# Patient Record
Sex: FEMALE | Race: WHITE | URBAN - METROPOLITAN AREA
[De-identification: names, ages, dates, MRNs, and addresses within clinical notes are randomized per-mention and may not be internally consistent; named-entity substitution may affect disease eponyms.]

---

## 2018-01-01 ENCOUNTER — APPOINTMENT (OUTPATIENT)
Dept: CT IMAGING | Age: 83
DRG: 064 | End: 2018-01-01
Payer: MEDICARE

## 2018-01-01 ENCOUNTER — APPOINTMENT (OUTPATIENT)
Dept: GENERAL RADIOLOGY | Age: 83
DRG: 064 | End: 2018-01-01
Payer: MEDICARE

## 2018-01-01 ENCOUNTER — APPOINTMENT (OUTPATIENT)
Dept: MRI IMAGING | Age: 83
DRG: 064 | End: 2018-01-01
Payer: MEDICARE

## 2018-01-01 ENCOUNTER — CARE COORDINATION (OUTPATIENT)
Dept: CASE MANAGEMENT | Age: 83
End: 2018-01-01

## 2018-01-01 ENCOUNTER — HOSPITAL ENCOUNTER (INPATIENT)
Age: 83
LOS: 6 days | Discharge: HOSPICE | DRG: 064 | End: 2018-10-07
Attending: EMERGENCY MEDICINE | Admitting: FAMILY MEDICINE
Payer: MEDICARE

## 2018-01-01 VITALS
DIASTOLIC BLOOD PRESSURE: 64 MMHG | SYSTOLIC BLOOD PRESSURE: 149 MMHG | BODY MASS INDEX: 38.18 KG/M2 | HEIGHT: 60 IN | RESPIRATION RATE: 20 BRPM | HEART RATE: 64 BPM | OXYGEN SATURATION: 90 % | TEMPERATURE: 97.8 F | WEIGHT: 194.45 LBS

## 2018-01-01 DIAGNOSIS — M25.561 PAIN, JOINT, LOWER LEG, RIGHT: ICD-10-CM

## 2018-01-01 DIAGNOSIS — R41.82 ALTERED MENTAL STATUS, UNSPECIFIED ALTERED MENTAL STATUS TYPE: Primary | ICD-10-CM

## 2018-01-01 DIAGNOSIS — I63.9 CEREBROVASCULAR ACCIDENT (CVA), UNSPECIFIED MECHANISM (HCC): ICD-10-CM

## 2018-01-01 LAB
-: ABNORMAL
ABSOLUTE BANDS #: 0.15 K/UL (ref 0–1)
ABSOLUTE BANDS #: 0.43 K/UL (ref 0–1)
ABSOLUTE BANDS #: 0.61 K/UL (ref 0–1)
ABSOLUTE BANDS #: 0.68 K/UL (ref 0–1)
ABSOLUTE BANDS #: 1.07 K/UL (ref 0–1)
ABSOLUTE BANDS #: 1.67 K/UL (ref 0–1)
ABSOLUTE EOS #: 0 K/UL (ref 0–0.4)
ABSOLUTE EOS #: 0.15 K/UL (ref 0–0.4)
ABSOLUTE EOS #: 0.17 K/UL (ref 0–0.4)
ABSOLUTE IMMATURE GRANULOCYTE: ABNORMAL K/UL (ref 0–0.3)
ABSOLUTE LYMPH #: 1.04 K/UL (ref 1–4.8)
ABSOLUTE LYMPH #: 1.31 K/UL (ref 1–4.8)
ABSOLUTE LYMPH #: 1.36 K/UL (ref 1–4.8)
ABSOLUTE LYMPH #: 1.67 K/UL (ref 1–4.8)
ABSOLUTE LYMPH #: 1.72 K/UL (ref 1–4.8)
ABSOLUTE LYMPH #: 1.98 K/UL (ref 1–4.8)
ABSOLUTE LYMPH #: 2.14 K/UL (ref 1–4.8)
ABSOLUTE MONO #: 0.46 K/UL (ref 0.1–1.3)
ABSOLUTE MONO #: 1.04 K/UL (ref 0.1–1.3)
ABSOLUTE MONO #: 1.31 K/UL (ref 0.1–1.3)
ABSOLUTE MONO #: 1.43 K/UL (ref 0.1–1.3)
ABSOLUTE MONO #: 1.52 K/UL (ref 0.1–1.3)
ABSOLUTE MONO #: 1.6 K/UL (ref 0.1–1.3)
ABSOLUTE MONO #: 1.87 K/UL (ref 0.1–1.3)
ALBUMIN SERPL-MCNC: 2.9 G/DL (ref 3.5–5.2)
ALBUMIN/GLOBULIN RATIO: ABNORMAL (ref 1–2.5)
ALP BLD-CCNC: 71 U/L (ref 35–104)
ALT SERPL-CCNC: 13 U/L (ref 5–33)
AMMONIA: 34 UMOL/L (ref 11–51)
AMORPHOUS: ABNORMAL
AMYLASE: 46 U/L (ref 28–100)
ANION GAP SERPL CALCULATED.3IONS-SCNC: 11 MMOL/L (ref 9–17)
ANION GAP SERPL CALCULATED.3IONS-SCNC: 11 MMOL/L (ref 9–17)
ANION GAP SERPL CALCULATED.3IONS-SCNC: 12 MMOL/L (ref 9–17)
ANION GAP SERPL CALCULATED.3IONS-SCNC: 13 MMOL/L (ref 9–17)
ANION GAP SERPL CALCULATED.3IONS-SCNC: 13 MMOL/L (ref 9–17)
ANION GAP SERPL CALCULATED.3IONS-SCNC: 8 MMOL/L (ref 9–17)
ANION GAP SERPL CALCULATED.3IONS-SCNC: 9 MMOL/L (ref 9–17)
AST SERPL-CCNC: 24 U/L
BACTERIA: ABNORMAL
BANDS: 1 % (ref 0–10)
BANDS: 11 % (ref 0–10)
BANDS: 3 % (ref 0–10)
BANDS: 4 % (ref 0–10)
BANDS: 4 % (ref 0–10)
BANDS: 6 % (ref 0–10)
BASOPHILS # BLD: 0 % (ref 0–2)
BASOPHILS # BLD: 1 % (ref 0–2)
BASOPHILS ABSOLUTE: 0 K/UL (ref 0–0.2)
BASOPHILS ABSOLUTE: 0.15 K/UL (ref 0–0.2)
BILIRUB SERPL-MCNC: 0.54 MG/DL (ref 0.3–1.2)
BILIRUBIN DIRECT: 0.2 MG/DL
BILIRUBIN URINE: NEGATIVE
BILIRUBIN URINE: NEGATIVE
BILIRUBIN, INDIRECT: 0.34 MG/DL (ref 0–1)
BUN BLDV-MCNC: 20 MG/DL (ref 8–23)
BUN BLDV-MCNC: 22 MG/DL (ref 8–23)
BUN BLDV-MCNC: 23 MG/DL (ref 8–23)
BUN BLDV-MCNC: 27 MG/DL (ref 8–23)
BUN BLDV-MCNC: 27 MG/DL (ref 8–23)
BUN BLDV-MCNC: 28 MG/DL (ref 8–23)
BUN BLDV-MCNC: 28 MG/DL (ref 8–23)
BUN/CREAT BLD: ABNORMAL (ref 9–20)
CALCIUM SERPL-MCNC: 8.2 MG/DL (ref 8.6–10.4)
CALCIUM SERPL-MCNC: 8.2 MG/DL (ref 8.6–10.4)
CALCIUM SERPL-MCNC: 8.4 MG/DL (ref 8.6–10.4)
CALCIUM SERPL-MCNC: 8.4 MG/DL (ref 8.6–10.4)
CALCIUM SERPL-MCNC: 8.5 MG/DL (ref 8.6–10.4)
CALCIUM SERPL-MCNC: 8.8 MG/DL (ref 8.6–10.4)
CALCIUM SERPL-MCNC: 9.3 MG/DL (ref 8.6–10.4)
CASTS UA: ABNORMAL /LPF
CHLORIDE BLD-SCNC: 102 MMOL/L (ref 98–107)
CHLORIDE BLD-SCNC: 102 MMOL/L (ref 98–107)
CHLORIDE BLD-SCNC: 105 MMOL/L (ref 98–107)
CHLORIDE BLD-SCNC: 105 MMOL/L (ref 98–107)
CHLORIDE BLD-SCNC: 106 MMOL/L (ref 98–107)
CHLORIDE BLD-SCNC: 108 MMOL/L (ref 98–107)
CHLORIDE BLD-SCNC: 109 MMOL/L (ref 98–107)
CO2: 22 MMOL/L (ref 20–31)
CO2: 23 MMOL/L (ref 20–31)
CO2: 24 MMOL/L (ref 20–31)
CO2: 24 MMOL/L (ref 20–31)
CO2: 26 MMOL/L (ref 20–31)
COLOR: ABNORMAL
COLOR: YELLOW
COMMENT UA: ABNORMAL
COMMENT UA: ABNORMAL
CREAT SERPL-MCNC: 0.76 MG/DL (ref 0.5–0.9)
CREAT SERPL-MCNC: 0.83 MG/DL (ref 0.5–0.9)
CREAT SERPL-MCNC: 0.86 MG/DL (ref 0.5–0.9)
CREAT SERPL-MCNC: 0.87 MG/DL (ref 0.5–0.9)
CREAT SERPL-MCNC: 0.93 MG/DL (ref 0.5–0.9)
CRYSTALS, UA: ABNORMAL /HPF
CULTURE: NO GROWTH
DIFFERENTIAL TYPE: ABNORMAL
EOSINOPHILS RELATIVE PERCENT: 0 % (ref 0–4)
EOSINOPHILS RELATIVE PERCENT: 1 % (ref 0–4)
EOSINOPHILS RELATIVE PERCENT: 1 % (ref 0–4)
EPITHELIAL CELLS UA: ABNORMAL /HPF
FOLATE: 18.9 NG/ML
GFR AFRICAN AMERICAN: >60 ML/MIN
GFR NON-AFRICAN AMERICAN: 56 ML/MIN
GFR NON-AFRICAN AMERICAN: >60 ML/MIN
GFR SERPL CREATININE-BSD FRML MDRD: ABNORMAL ML/MIN/{1.73_M2}
GLOBULIN: ABNORMAL G/DL (ref 1.5–3.8)
GLUCOSE BLD-MCNC: 158 MG/DL (ref 70–99)
GLUCOSE BLD-MCNC: 162 MG/DL (ref 70–99)
GLUCOSE BLD-MCNC: 163 MG/DL (ref 70–99)
GLUCOSE BLD-MCNC: 177 MG/DL (ref 70–99)
GLUCOSE BLD-MCNC: 178 MG/DL (ref 70–99)
GLUCOSE BLD-MCNC: 206 MG/DL (ref 70–99)
GLUCOSE BLD-MCNC: 221 MG/DL (ref 70–99)
GLUCOSE URINE: ABNORMAL
GLUCOSE URINE: NEGATIVE
HCT VFR BLD CALC: 37.4 % (ref 36–46)
HCT VFR BLD CALC: 39 % (ref 36–46)
HCT VFR BLD CALC: 39.6 % (ref 36–46)
HCT VFR BLD CALC: 40.3 % (ref 36–46)
HCT VFR BLD CALC: 40.8 % (ref 36–46)
HCT VFR BLD CALC: 42.1 % (ref 36–46)
HCT VFR BLD CALC: 43.7 % (ref 36–46)
HEMOGLOBIN: 12.1 G/DL (ref 12–16)
HEMOGLOBIN: 12.8 G/DL (ref 12–16)
HEMOGLOBIN: 13 G/DL (ref 12–16)
HEMOGLOBIN: 13.1 G/DL (ref 12–16)
HEMOGLOBIN: 13.2 G/DL (ref 12–16)
HEMOGLOBIN: 13.7 G/DL (ref 12–16)
HEMOGLOBIN: 14.6 G/DL (ref 12–16)
IMMATURE GRANULOCYTES: ABNORMAL %
KETONES, URINE: ABNORMAL
KETONES, URINE: NEGATIVE
LACTIC ACID, SEPSIS WHOLE BLOOD: NORMAL MMOL/L (ref 0.5–1.9)
LACTIC ACID, SEPSIS: 1.4 MMOL/L (ref 0.5–1.9)
LEUKOCYTE ESTERASE, URINE: NEGATIVE
LEUKOCYTE ESTERASE, URINE: NEGATIVE
LIPASE: 10 U/L (ref 13–60)
LYMPHOCYTES # BLD: 11 % (ref 24–44)
LYMPHOCYTES # BLD: 12 % (ref 24–44)
LYMPHOCYTES # BLD: 12 % (ref 24–44)
LYMPHOCYTES # BLD: 13 % (ref 24–44)
LYMPHOCYTES # BLD: 7 % (ref 24–44)
LYMPHOCYTES # BLD: 8 % (ref 24–44)
LYMPHOCYTES # BLD: 9 % (ref 24–44)
Lab: NORMAL
MCH RBC QN AUTO: 30.9 PG (ref 26–34)
MCH RBC QN AUTO: 31 PG (ref 26–34)
MCH RBC QN AUTO: 31.2 PG (ref 26–34)
MCH RBC QN AUTO: 31.2 PG (ref 26–34)
MCH RBC QN AUTO: 31.3 PG (ref 26–34)
MCH RBC QN AUTO: 31.3 PG (ref 26–34)
MCH RBC QN AUTO: 31.5 PG (ref 26–34)
MCHC RBC AUTO-ENTMCNC: 32.1 G/DL (ref 31–37)
MCHC RBC AUTO-ENTMCNC: 32.4 G/DL (ref 31–37)
MCHC RBC AUTO-ENTMCNC: 32.5 G/DL (ref 31–37)
MCHC RBC AUTO-ENTMCNC: 32.5 G/DL (ref 31–37)
MCHC RBC AUTO-ENTMCNC: 32.9 G/DL (ref 31–37)
MCHC RBC AUTO-ENTMCNC: 33.2 G/DL (ref 31–37)
MCHC RBC AUTO-ENTMCNC: 33.3 G/DL (ref 31–37)
MCV RBC AUTO: 92.9 FL (ref 80–100)
MCV RBC AUTO: 93.8 FL (ref 80–100)
MCV RBC AUTO: 94.8 FL (ref 80–100)
MCV RBC AUTO: 95.4 FL (ref 80–100)
MCV RBC AUTO: 96.1 FL (ref 80–100)
MCV RBC AUTO: 97.1 FL (ref 80–100)
MCV RBC AUTO: 97.3 FL (ref 80–100)
METAMYELOCYTES ABSOLUTE COUNT: 0.14 K/UL
METAMYELOCYTES ABSOLUTE COUNT: 0.15 K/UL
METAMYELOCYTES ABSOLUTE COUNT: 0.18 K/UL
METAMYELOCYTES ABSOLUTE COUNT: 0.44 K/UL
METAMYELOCYTES: 1 %
METAMYELOCYTES: 3 %
MONOCYTES # BLD: 10 % (ref 1–7)
MONOCYTES # BLD: 10 % (ref 1–7)
MONOCYTES # BLD: 11 % (ref 1–7)
MONOCYTES # BLD: 3 % (ref 1–7)
MONOCYTES # BLD: 7 % (ref 1–7)
MONOCYTES # BLD: 9 % (ref 1–7)
MONOCYTES # BLD: 9 % (ref 1–7)
MORPHOLOGY: ABNORMAL
MORPHOLOGY: NORMAL
MUCUS: ABNORMAL
MYELOCYTES ABSOLUTE COUNT: 0.58 K/UL
MYELOCYTES: 4 %
MYOGLOBIN: 110 NG/ML (ref 25–58)
NITRITE, URINE: NEGATIVE
NITRITE, URINE: NEGATIVE
NRBC AUTOMATED: ABNORMAL PER 100 WBC
OTHER OBSERVATIONS UA: ABNORMAL
PDW BLD-RTO: 12.7 % (ref 11.5–14.9)
PDW BLD-RTO: 13.2 % (ref 11.5–14.9)
PDW BLD-RTO: 13.2 % (ref 11.5–14.9)
PDW BLD-RTO: 13.3 % (ref 11.5–14.9)
PDW BLD-RTO: 13.8 % (ref 11.5–14.9)
PH UA: 5 (ref 5–8)
PH UA: 5.5 (ref 5–8)
PLATELET # BLD: 192 K/UL (ref 150–450)
PLATELET # BLD: 193 K/UL (ref 150–450)
PLATELET # BLD: 204 K/UL (ref 150–450)
PLATELET # BLD: 213 K/UL (ref 150–450)
PLATELET # BLD: 213 K/UL (ref 150–450)
PLATELET # BLD: 225 K/UL (ref 150–450)
PLATELET # BLD: 242 K/UL (ref 150–450)
PLATELET ESTIMATE: ABNORMAL
PMV BLD AUTO: 8.8 FL (ref 6–12)
PMV BLD AUTO: 9 FL (ref 6–12)
PMV BLD AUTO: 9.2 FL (ref 6–12)
PMV BLD AUTO: 9.5 FL (ref 6–12)
PMV BLD AUTO: 9.5 FL (ref 6–12)
PMV BLD AUTO: 9.6 FL (ref 6–12)
PMV BLD AUTO: 9.7 FL (ref 6–12)
POTASSIUM SERPL-SCNC: 4 MMOL/L (ref 3.7–5.3)
POTASSIUM SERPL-SCNC: 4.1 MMOL/L (ref 3.7–5.3)
POTASSIUM SERPL-SCNC: 4.1 MMOL/L (ref 3.7–5.3)
POTASSIUM SERPL-SCNC: 4.2 MMOL/L (ref 3.7–5.3)
POTASSIUM SERPL-SCNC: 4.2 MMOL/L (ref 3.7–5.3)
POTASSIUM SERPL-SCNC: 4.5 MMOL/L (ref 3.7–5.3)
POTASSIUM SERPL-SCNC: 4.5 MMOL/L (ref 3.7–5.3)
PROTEIN UA: NEGATIVE
PROTEIN UA: NEGATIVE
RBC # BLD: 3.91 M/UL (ref 4–5.2)
RBC # BLD: 4.12 M/UL (ref 4–5.2)
RBC # BLD: 4.14 M/UL (ref 4–5.2)
RBC # BLD: 4.2 M/UL (ref 4–5.2)
RBC # BLD: 4.26 M/UL (ref 4–5.2)
RBC # BLD: 4.38 M/UL (ref 4–5.2)
RBC # BLD: 4.66 M/UL (ref 4–5.2)
RBC # BLD: ABNORMAL 10*6/UL
RBC UA: ABNORMAL /HPF
RENAL EPITHELIAL, UA: ABNORMAL /HPF
SEG NEUTROPHILS: 71 % (ref 36–66)
SEG NEUTROPHILS: 72 % (ref 36–66)
SEG NEUTROPHILS: 74 % (ref 36–66)
SEG NEUTROPHILS: 74 % (ref 36–66)
SEG NEUTROPHILS: 75 % (ref 36–66)
SEG NEUTROPHILS: 76 % (ref 36–66)
SEG NEUTROPHILS: 85 % (ref 36–66)
SEGMENTED NEUTROPHILS ABSOLUTE COUNT: 10.58 K/UL (ref 1.3–9.1)
SEGMENTED NEUTROPHILS ABSOLUTE COUNT: 10.79 K/UL (ref 1.3–9.1)
SEGMENTED NEUTROPHILS ABSOLUTE COUNT: 10.81 K/UL (ref 1.3–9.1)
SEGMENTED NEUTROPHILS ABSOLUTE COUNT: 11.4 K/UL (ref 1.3–9.1)
SEGMENTED NEUTROPHILS ABSOLUTE COUNT: 12.67 K/UL (ref 1.3–9.1)
SEGMENTED NEUTROPHILS ABSOLUTE COUNT: 12.81 K/UL (ref 1.3–9.1)
SEGMENTED NEUTROPHILS ABSOLUTE COUNT: 12.92 K/UL (ref 1.3–9.1)
SODIUM BLD-SCNC: 138 MMOL/L (ref 135–144)
SODIUM BLD-SCNC: 140 MMOL/L (ref 135–144)
SODIUM BLD-SCNC: 141 MMOL/L (ref 135–144)
SODIUM BLD-SCNC: 142 MMOL/L (ref 135–144)
SODIUM BLD-SCNC: 144 MMOL/L (ref 135–144)
SPECIFIC GRAVITY UA: 1.02 (ref 1–1.03)
SPECIFIC GRAVITY UA: 1.03 (ref 1–1.03)
SPECIMEN DESCRIPTION: NORMAL
STATUS: NORMAL
TOTAL CK: 59 U/L (ref 26–192)
TOTAL PROTEIN: 6.7 G/DL (ref 6.4–8.3)
TRICHOMONAS: ABNORMAL
TSH SERPL DL<=0.05 MIU/L-ACNC: 1.2 MIU/L (ref 0.3–5)
TURBIDITY: ABNORMAL
TURBIDITY: CLEAR
URINE HGB: NEGATIVE
URINE HGB: NEGATIVE
UROBILINOGEN, URINE: NORMAL
UROBILINOGEN, URINE: NORMAL
VITAMIN B-12: 293 PG/ML (ref 232–1245)
WBC # BLD: 14.3 K/UL (ref 3.5–11)
WBC # BLD: 14.6 K/UL (ref 3.5–11)
WBC # BLD: 14.9 K/UL (ref 3.5–11)
WBC # BLD: 15.2 K/UL (ref 3.5–11)
WBC # BLD: 15.2 K/UL (ref 3.5–11)
WBC # BLD: 17 K/UL (ref 3.5–11)
WBC # BLD: 17.8 K/UL (ref 3.5–11)
WBC # BLD: ABNORMAL 10*3/UL
WBC UA: ABNORMAL /HPF
YEAST: ABNORMAL

## 2018-01-01 PROCEDURE — 6360000002 HC RX W HCPCS: Performed by: FAMILY MEDICINE

## 2018-01-01 PROCEDURE — 80048 BASIC METABOLIC PNL TOTAL CA: CPT

## 2018-01-01 PROCEDURE — 36415 COLL VENOUS BLD VENIPUNCTURE: CPT

## 2018-01-01 PROCEDURE — 2580000003 HC RX 258: Performed by: FAMILY MEDICINE

## 2018-01-01 PROCEDURE — 6360000004 HC RX CONTRAST MEDICATION: Performed by: FAMILY MEDICINE

## 2018-01-01 PROCEDURE — 87086 URINE CULTURE/COLONY COUNT: CPT

## 2018-01-01 PROCEDURE — 83605 ASSAY OF LACTIC ACID: CPT

## 2018-01-01 PROCEDURE — 82150 ASSAY OF AMYLASE: CPT

## 2018-01-01 PROCEDURE — 6360000002 HC RX W HCPCS: Performed by: STUDENT IN AN ORGANIZED HEALTH CARE EDUCATION/TRAINING PROGRAM

## 2018-01-01 PROCEDURE — 82746 ASSAY OF FOLIC ACID SERUM: CPT

## 2018-01-01 PROCEDURE — G8997 SWALLOW GOAL STATUS: HCPCS

## 2018-01-01 PROCEDURE — 81001 URINALYSIS AUTO W/SCOPE: CPT

## 2018-01-01 PROCEDURE — 80076 HEPATIC FUNCTION PANEL: CPT

## 2018-01-01 PROCEDURE — 70470 CT HEAD/BRAIN W/O & W/DYE: CPT

## 2018-01-01 PROCEDURE — 92526 ORAL FUNCTION THERAPY: CPT

## 2018-01-01 PROCEDURE — 85025 COMPLETE CBC W/AUTO DIFF WBC: CPT

## 2018-01-01 PROCEDURE — 99232 SBSQ HOSP IP/OBS MODERATE 35: CPT | Performed by: FAMILY MEDICINE

## 2018-01-01 PROCEDURE — 2500000003 HC RX 250 WO HCPCS: Performed by: FAMILY MEDICINE

## 2018-01-01 PROCEDURE — 2060000000 HC ICU INTERMEDIATE R&B

## 2018-01-01 PROCEDURE — 99239 HOSP IP/OBS DSCHRG MGMT >30: CPT | Performed by: FAMILY MEDICINE

## 2018-01-01 PROCEDURE — 99285 EMERGENCY DEPT VISIT HI MDM: CPT

## 2018-01-01 PROCEDURE — G8978 MOBILITY CURRENT STATUS: HCPCS

## 2018-01-01 PROCEDURE — 74018 RADEX ABDOMEN 1 VIEW: CPT

## 2018-01-01 PROCEDURE — 6360000004 HC RX CONTRAST MEDICATION: Performed by: PSYCHIATRY & NEUROLOGY

## 2018-01-01 PROCEDURE — 92610 EVALUATE SWALLOWING FUNCTION: CPT

## 2018-01-01 PROCEDURE — 70450 CT HEAD/BRAIN W/O DYE: CPT

## 2018-01-01 PROCEDURE — 84443 ASSAY THYROID STIM HORMONE: CPT

## 2018-01-01 PROCEDURE — G8979 MOBILITY GOAL STATUS: HCPCS

## 2018-01-01 PROCEDURE — 97161 PT EVAL LOW COMPLEX 20 MIN: CPT

## 2018-01-01 PROCEDURE — 2580000003 HC RX 258: Performed by: PSYCHIATRY & NEUROLOGY

## 2018-01-01 PROCEDURE — 2580000003 HC RX 258: Performed by: STUDENT IN AN ORGANIZED HEALTH CARE EDUCATION/TRAINING PROGRAM

## 2018-01-01 PROCEDURE — G8996 SWALLOW CURRENT STATUS: HCPCS

## 2018-01-01 PROCEDURE — 99222 1ST HOSP IP/OBS MODERATE 55: CPT | Performed by: FAMILY MEDICINE

## 2018-01-01 PROCEDURE — 82550 ASSAY OF CK (CPK): CPT

## 2018-01-01 PROCEDURE — 81003 URINALYSIS AUTO W/O SCOPE: CPT

## 2018-01-01 PROCEDURE — 74177 CT ABD & PELVIS W/CONTRAST: CPT

## 2018-01-01 PROCEDURE — 95816 EEG AWAKE AND DROWSY: CPT

## 2018-01-01 PROCEDURE — 83690 ASSAY OF LIPASE: CPT

## 2018-01-01 PROCEDURE — 71045 X-RAY EXAM CHEST 1 VIEW: CPT

## 2018-01-01 PROCEDURE — 82607 VITAMIN B-12: CPT

## 2018-01-01 PROCEDURE — 6360000002 HC RX W HCPCS: Performed by: EMERGENCY MEDICINE

## 2018-01-01 PROCEDURE — 82140 ASSAY OF AMMONIA: CPT

## 2018-01-01 PROCEDURE — 83874 ASSAY OF MYOGLOBIN: CPT

## 2018-01-01 PROCEDURE — 97110 THERAPEUTIC EXERCISES: CPT

## 2018-01-01 RX ORDER — ACETAMINOPHEN 325 MG/1
650 TABLET ORAL EVERY 4 HOURS PRN
Status: DISCONTINUED | OUTPATIENT
Start: 2018-01-01 | End: 2018-01-01 | Stop reason: HOSPADM

## 2018-01-01 RX ORDER — SODIUM CHLORIDE 0.9 % (FLUSH) 0.9 %
10 SYRINGE (ML) INJECTION PRN
Status: DISCONTINUED | OUTPATIENT
Start: 2018-01-01 | End: 2018-01-01

## 2018-01-01 RX ORDER — METOPROLOL TARTRATE 5 MG/5ML
5 INJECTION INTRAVENOUS EVERY 6 HOURS PRN
Status: DISCONTINUED | OUTPATIENT
Start: 2018-01-01 | End: 2018-01-01 | Stop reason: HOSPADM

## 2018-01-01 RX ORDER — M-VIT,TX,IRON,MINS/CALC/FOLIC 27MG-0.4MG
1 TABLET ORAL DAILY
Status: DISCONTINUED | OUTPATIENT
Start: 2018-01-01 | End: 2018-01-01 | Stop reason: HOSPADM

## 2018-01-01 RX ORDER — 0.9 % SODIUM CHLORIDE 0.9 %
80 INTRAVENOUS SOLUTION INTRAVENOUS ONCE
Status: COMPLETED | OUTPATIENT
Start: 2018-01-01 | End: 2018-01-01

## 2018-01-01 RX ORDER — LORAZEPAM 2 MG/ML
1 INJECTION INTRAMUSCULAR ONCE
Status: COMPLETED | OUTPATIENT
Start: 2018-01-01 | End: 2018-01-01

## 2018-01-01 RX ORDER — FAMOTIDINE 20 MG/1
20 TABLET, FILM COATED ORAL DAILY
Status: DISCONTINUED | OUTPATIENT
Start: 2018-01-01 | End: 2018-01-01 | Stop reason: HOSPADM

## 2018-01-01 RX ORDER — MORPHINE SULFATE 20 MG/ML
4 SOLUTION ORAL
Qty: 30 ML | Refills: 0 | Status: SHIPPED | OUTPATIENT
Start: 2018-01-01 | End: 2018-11-06

## 2018-01-01 RX ORDER — LISINOPRIL 10 MG/1
10 TABLET ORAL DAILY
Status: DISCONTINUED | OUTPATIENT
Start: 2018-01-01 | End: 2018-01-01

## 2018-01-01 RX ORDER — ONDANSETRON 2 MG/ML
4 INJECTION INTRAMUSCULAR; INTRAVENOUS EVERY 6 HOURS PRN
Status: DISCONTINUED | OUTPATIENT
Start: 2018-01-01 | End: 2018-01-01 | Stop reason: HOSPADM

## 2018-01-01 RX ORDER — MORPHINE SULFATE 20 MG/ML
4 SOLUTION ORAL
Status: DISCONTINUED | OUTPATIENT
Start: 2018-01-01 | End: 2018-01-01 | Stop reason: HOSPADM

## 2018-01-01 RX ORDER — 0.9 % SODIUM CHLORIDE 0.9 %
1000 INTRAVENOUS SOLUTION INTRAVENOUS ONCE
Status: COMPLETED | OUTPATIENT
Start: 2018-01-01 | End: 2018-01-01

## 2018-01-01 RX ORDER — LORAZEPAM 2 MG/ML
1 INJECTION INTRAMUSCULAR EVERY 4 HOURS PRN
Status: DISCONTINUED | OUTPATIENT
Start: 2018-01-01 | End: 2018-01-01 | Stop reason: HOSPADM

## 2018-01-01 RX ORDER — KETOROLAC TROMETHAMINE 30 MG/ML
30 INJECTION, SOLUTION INTRAMUSCULAR; INTRAVENOUS EVERY 6 HOURS PRN
Status: DISCONTINUED | OUTPATIENT
Start: 2018-01-01 | End: 2018-01-01 | Stop reason: HOSPADM

## 2018-01-01 RX ORDER — ASPIRIN 81 MG/1
81 TABLET, CHEWABLE ORAL DAILY
Status: DISCONTINUED | OUTPATIENT
Start: 2018-01-01 | End: 2018-01-01

## 2018-01-01 RX ORDER — SODIUM CHLORIDE 0.9 % (FLUSH) 0.9 %
10 SYRINGE (ML) INJECTION PRN
Status: DISCONTINUED | OUTPATIENT
Start: 2018-01-01 | End: 2018-01-01 | Stop reason: HOSPADM

## 2018-01-01 RX ORDER — SODIUM CHLORIDE 0.9 % (FLUSH) 0.9 %
10 SYRINGE (ML) INJECTION EVERY 12 HOURS SCHEDULED
Status: DISCONTINUED | OUTPATIENT
Start: 2018-01-01 | End: 2018-01-01 | Stop reason: HOSPADM

## 2018-01-01 RX ORDER — HYDRALAZINE HYDROCHLORIDE 20 MG/ML
10 INJECTION INTRAMUSCULAR; INTRAVENOUS EVERY 6 HOURS PRN
Status: DISCONTINUED | OUTPATIENT
Start: 2018-01-01 | End: 2018-01-01

## 2018-01-01 RX ORDER — DEXTROSE, SODIUM CHLORIDE, AND POTASSIUM CHLORIDE 5; .45; .15 G/100ML; G/100ML; G/100ML
INJECTION INTRAVENOUS CONTINUOUS
Status: DISCONTINUED | OUTPATIENT
Start: 2018-01-01 | End: 2018-01-01

## 2018-01-01 RX ORDER — SODIUM CHLORIDE 9 MG/ML
INJECTION, SOLUTION INTRAVENOUS CONTINUOUS
Status: DISCONTINUED | OUTPATIENT
Start: 2018-01-01 | End: 2018-01-01

## 2018-01-01 RX ADMIN — SODIUM CHLORIDE 1000 ML: 9 INJECTION, SOLUTION INTRAVENOUS at 17:00

## 2018-01-01 RX ADMIN — Medication 10 ML: at 09:10

## 2018-01-01 RX ADMIN — HYDRALAZINE HYDROCHLORIDE 10 MG: 20 INJECTION INTRAMUSCULAR; INTRAVENOUS at 00:36

## 2018-01-01 RX ADMIN — METRONIDAZOLE 500 MG: 500 INJECTION, SOLUTION INTRAVENOUS at 18:02

## 2018-01-01 RX ADMIN — ENOXAPARIN SODIUM 40 MG: 40 INJECTION SUBCUTANEOUS at 08:29

## 2018-01-01 RX ADMIN — IOPAMIDOL 75 ML: 755 INJECTION, SOLUTION INTRAVENOUS at 09:51

## 2018-01-01 RX ADMIN — KETOROLAC TROMETHAMINE 30 MG: 30 INJECTION, SOLUTION INTRAMUSCULAR at 00:27

## 2018-01-01 RX ADMIN — METRONIDAZOLE 500 MG: 500 INJECTION, SOLUTION INTRAVENOUS at 16:38

## 2018-01-01 RX ADMIN — LORAZEPAM 1 MG: 2 INJECTION INTRAMUSCULAR; INTRAVENOUS at 11:54

## 2018-01-01 RX ADMIN — Medication 10 ML: at 08:30

## 2018-01-01 RX ADMIN — Medication 10 ML: at 23:24

## 2018-01-01 RX ADMIN — SODIUM CHLORIDE: 9 INJECTION, SOLUTION INTRAVENOUS at 08:29

## 2018-01-01 RX ADMIN — METRONIDAZOLE 500 MG: 500 INJECTION, SOLUTION INTRAVENOUS at 08:29

## 2018-01-01 RX ADMIN — SODIUM CHLORIDE: 9 INJECTION, SOLUTION INTRAVENOUS at 01:42

## 2018-01-01 RX ADMIN — Medication 10 ML: at 13:00

## 2018-01-01 RX ADMIN — LORAZEPAM 1 MG: 2 INJECTION INTRAMUSCULAR; INTRAVENOUS at 04:31

## 2018-01-01 RX ADMIN — LORAZEPAM 1 MG: 2 INJECTION INTRAMUSCULAR; INTRAVENOUS at 20:09

## 2018-01-01 RX ADMIN — METRONIDAZOLE 500 MG: 500 INJECTION, SOLUTION INTRAVENOUS at 08:47

## 2018-01-01 RX ADMIN — SODIUM CHLORIDE 80 ML: 9 INJECTION, SOLUTION INTRAVENOUS at 09:51

## 2018-01-01 RX ADMIN — ENOXAPARIN SODIUM 40 MG: 40 INJECTION SUBCUTANEOUS at 08:58

## 2018-01-01 RX ADMIN — SODIUM CHLORIDE: 9 INJECTION, SOLUTION INTRAVENOUS at 06:03

## 2018-01-01 RX ADMIN — CEFTRIAXONE SODIUM 1 G: 1 INJECTION, POWDER, FOR SOLUTION INTRAMUSCULAR; INTRAVENOUS at 20:12

## 2018-01-01 RX ADMIN — ENOXAPARIN SODIUM 40 MG: 40 INJECTION SUBCUTANEOUS at 08:47

## 2018-01-01 RX ADMIN — POTASSIUM CHLORIDE, DEXTROSE MONOHYDRATE AND SODIUM CHLORIDE: 150; 5; 450 INJECTION, SOLUTION INTRAVENOUS at 08:58

## 2018-01-01 RX ADMIN — LORAZEPAM 1 MG: 2 INJECTION INTRAMUSCULAR; INTRAVENOUS at 20:14

## 2018-01-01 RX ADMIN — METRONIDAZOLE 500 MG: 500 INJECTION, SOLUTION INTRAVENOUS at 02:11

## 2018-01-01 RX ADMIN — KETOROLAC TROMETHAMINE 30 MG: 30 INJECTION, SOLUTION INTRAMUSCULAR at 06:39

## 2018-01-01 RX ADMIN — Medication 10 ML: at 09:51

## 2018-01-01 RX ADMIN — SODIUM CHLORIDE: 9 INJECTION, SOLUTION INTRAVENOUS at 17:48

## 2018-01-01 RX ADMIN — IOPAMIDOL 75 ML: 755 INJECTION, SOLUTION INTRAVENOUS at 13:00

## 2018-01-01 RX ADMIN — VANCOMYCIN HYDROCHLORIDE 1000 MG: 1 INJECTION, POWDER, LYOPHILIZED, FOR SOLUTION INTRAVENOUS at 21:01

## 2018-01-01 RX ADMIN — CEFTRIAXONE SODIUM 1 G: 1 INJECTION, POWDER, FOR SOLUTION INTRAMUSCULAR; INTRAVENOUS at 22:59

## 2018-01-01 RX ADMIN — CEFTRIAXONE SODIUM 1 G: 1 INJECTION, POWDER, FOR SOLUTION INTRAMUSCULAR; INTRAVENOUS at 20:41

## 2018-01-01 RX ADMIN — METRONIDAZOLE 500 MG: 500 INJECTION, SOLUTION INTRAVENOUS at 17:26

## 2018-01-01 RX ADMIN — ENOXAPARIN SODIUM 40 MG: 40 INJECTION SUBCUTANEOUS at 10:21

## 2018-01-01 RX ADMIN — METRONIDAZOLE 500 MG: 500 INJECTION, SOLUTION INTRAVENOUS at 01:11

## 2018-01-01 RX ADMIN — METRONIDAZOLE 500 MG: 500 INJECTION, SOLUTION INTRAVENOUS at 10:22

## 2018-01-01 RX ADMIN — SODIUM CHLORIDE 80 ML: 9 INJECTION, SOLUTION INTRAVENOUS at 12:59

## 2018-01-01 RX ADMIN — POTASSIUM CHLORIDE, DEXTROSE MONOHYDRATE AND SODIUM CHLORIDE: 150; 5; 450 INJECTION, SOLUTION INTRAVENOUS at 08:53

## 2018-01-01 RX ADMIN — ENOXAPARIN SODIUM 40 MG: 40 INJECTION SUBCUTANEOUS at 09:47

## 2018-01-01 RX ADMIN — LORAZEPAM 1 MG: 2 INJECTION INTRAMUSCULAR; INTRAVENOUS at 01:04

## 2018-01-01 RX ADMIN — POTASSIUM CHLORIDE, DEXTROSE MONOHYDRATE AND SODIUM CHLORIDE: 150; 5; 450 INJECTION, SOLUTION INTRAVENOUS at 11:43

## 2018-01-01 RX ADMIN — ENOXAPARIN SODIUM 40 MG: 40 INJECTION SUBCUTANEOUS at 09:10

## 2018-01-01 RX ADMIN — METRONIDAZOLE 500 MG: 500 INJECTION, SOLUTION INTRAVENOUS at 00:42

## 2018-01-01 RX ADMIN — POTASSIUM CHLORIDE, DEXTROSE MONOHYDRATE AND SODIUM CHLORIDE: 150; 5; 450 INJECTION, SOLUTION INTRAVENOUS at 22:10

## 2018-01-01 RX ADMIN — POTASSIUM CHLORIDE, DEXTROSE MONOHYDRATE AND SODIUM CHLORIDE: 150; 5; 450 INJECTION, SOLUTION INTRAVENOUS at 03:49

## 2018-01-01 RX ADMIN — CEFTRIAXONE SODIUM 1 G: 1 INJECTION, POWDER, FOR SOLUTION INTRAMUSCULAR; INTRAVENOUS at 21:20

## 2018-01-01 RX ADMIN — LORAZEPAM 1 MG: 2 INJECTION INTRAMUSCULAR; INTRAVENOUS at 01:55

## 2018-01-01 RX ADMIN — POTASSIUM CHLORIDE, DEXTROSE MONOHYDRATE AND SODIUM CHLORIDE: 150; 5; 450 INJECTION, SOLUTION INTRAVENOUS at 18:34

## 2018-01-01 ASSESSMENT — PAIN SCALES - GENERAL
PAINLEVEL_OUTOF10: 0
PAINLEVEL_OUTOF10: 6

## 2018-10-01 PROBLEM — R41.82 ALTERED MENTAL STATUS: Status: ACTIVE | Noted: 2018-01-01

## 2018-10-01 NOTE — ED NOTES
Called lab re: CK not resulted.  Per tech- machine down- now working - will result shortly     Jennifer Quinones RN  10/01/18 9558

## 2018-10-01 NOTE — ED PROVIDER NOTES
Hemoglobin 14.6 12.0 - 16.0 g/dL    Hematocrit 43.7 36 - 46 %    MCV 93.8 80 - 100 fL    MCH 31.3 26 - 34 pg    MCHC 33.3 31 - 37 g/dL    RDW 12.7 11.5 - 14.9 %    Platelets 315 396 - 976 k/uL    MPV 9.2 6.0 - 12.0 fL    NRBC Automated NOT REPORTED per 100 WBC    Differential Type NOT REPORTED     Immature Granulocytes NOT REPORTED 0 %    Absolute Immature Granulocyte NOT REPORTED 0.00 - 0.30 k/uL    WBC Morphology NOT REPORTED     RBC Morphology NOT REPORTED     Platelet Estimate NOT REPORTED     Seg Neutrophils 85 (H) 36 - 66 %    Lymphocytes 7 (L) 24 - 44 %    Monocytes 7 1 - 7 %    Eosinophils % 0 0 - 4 %    Basophils 1 0 - 2 %    Segs Absolute 12.67 (H) 1.3 - 9.1 k/uL    Absolute Lymph # 1.04 1.0 - 4.8 k/uL    Absolute Mono # 1.04 0.1 - 1.3 k/uL    Absolute Eos # 0.00 0.0 - 0.4 k/uL    Basophils # 0.15 0.0 - 0.2 k/uL    Morphology Normal    Basic Metabolic Prof   Result Value Ref Range    Glucose 163 (H) 70 - 99 mg/dL    BUN 28 (H) 8 - 23 mg/dL    CREATININE 0.86 0.50 - 0.90 mg/dL    Bun/Cre Ratio NOT REPORTED 9 - 20    Calcium 9.3 8.6 - 10.4 mg/dL    Sodium 141 135 - 144 mmol/L    Potassium 4.2 3.7 - 5.3 mmol/L    Chloride 102 98 - 107 mmol/L    CO2 26 20 - 31 mmol/L    Anion Gap 13 9 - 17 mmol/L    GFR Non-African American >60 >60 mL/min    GFR African American >60 >60 mL/min    GFR Comment          GFR Staging NOT REPORTED    Urinalysis   Result Value Ref Range    Color, UA YELLOW YEL    Turbidity UA CLEAR CLEAR    Glucose, Ur NEGATIVE NEG    Bilirubin Urine NEGATIVE NEG    Ketones, Urine MOD (A) NEG    Specific Gravity, UA 1.025 1.000 - 1.030    Urine Hgb NEGATIVE NEG    pH, UA 5.0 5.0 - 8.0    Protein, UA NEGATIVE NEG    Urobilinogen, Urine Normal NORM    Nitrite, Urine NEGATIVE NEG    Leukocyte Esterase, Urine NEGATIVE NEG    Urinalysis Comments       Microscopic exam not performed based on chemical results unless requested in   CK   Result Value Ref Range    Total CK 59 26 - 192 U/L   Myoglobin medial occipital lobe. A 2nd vague area of low attenuation in the left basal ganglia with somewhat indistinct margins noted. These findings could be related to age-indeterminate ischemia, some may be new or than others. Prominent sulci. No acute blood products. ORBITS: The visualized portion of the orbits demonstrate no acute abnormality. SINUSES: The visualized paranasal sinuses and mastoid air cells demonstrate no acute abnormality. SOFT TISSUES/SKULL:  No acute abnormality of the visualized skull or soft tissues. Vague areas of low attenuation, 1 in the left basal ganglia and the other near the temporoparietal junction on the left. Findings may represent age-indeterminate vascular insult. Consider MRI for further evaluation. Patient has history of breast cancer. Findings could also represent a metastatic lesion in the appropriate clinical setting. Critical results were called by Dr. Laxmi Oleary to [facility] on 10/1/2018 at 17:58. Xr Chest Portable    Result Date: 10/1/2018  EXAMINATION: SINGLE XRAY VIEW OF THE CHEST, 10/1/2018 4:55 pm COMPARISON: June 5, 2009 HISTORY: ORDERING SYSTEM PROVIDED HISTORY: altered mental status TECHNOLOGIST PROVIDED HISTORY: altered mental status Ordering Physician Provided Reason for Exam: AMS , possible UTI. PT hallucinating Acuity: Acute Type of Exam: Initial FINDINGS: Minimal linear right lower lobe opacity. No focal consolidation. Cardiomegaly. Tortuous aorta. No pulmonary edema. Minimal linear right lower lobe opacity likely represents atelectasis or scarring. ED BEDSIDE ULTRASOUND:   Not indicated    [address] [place] [address] / [facility]   81 y/o F here with altered mental status. Patient typically is A. O x 4 at baseline, is able to take care of herself. Noted by her daughter today to be somnolent and not responding. Last seen normal on Saturday. She denies any pain however does nto provide any history otherwise. Vitals are unremarkable.  On exam patient has no acute findings; 5/5 strength BUE with no obvious focal neurologic deficits, no outward signs of trauma. Concerns for possible delirium, will obtain CBC, BMP, CK, myglobin, UA, urine culture. Will also obtain CT head and chest xray. Patient with leukocytosis at 14.9 with left shift at 85%. Myoglobin elevated at 110. Urinalysis is unremarkable other than moderate ketones. Will go ahead and treat with broad spectrum antibiotics at this time. CT head shows areas of low attentuation in the L basal ganglia and near temporoparietal junction concerning for old infarct vs metastasis from prior hx of breast CA. Patients mental status is unchanged, she is still confused, does nto answer questions, mutters incomprehensible words. Will admit to internal medicine for further evaluation and management. Discussed plan with patients daughter; she is agreeable. Discussed code status; she would like patient to be a DNR-CCA. DNR form signed and placed in chart; patients code status changed in the computer. Patient admitted to Dr. Portia Stack. PROCEDURES:  Procedures    CONSULTS:  IP CONSULT TO INTERNAL MEDICINE    CRITICAL CARE:  See attending note    FINAL IMPRESSION      1. Altered mental status, unspecified altered mental status type        DISPOSITION / PLAN     DISPOSITION Admitted 10/01/2018 07:48:54 PM    If the patient was admitted, some of the above orders, medications, labs, and consults may have been placed by the admitting team(s) and were auto populated above when I refreshed my note prior to signing it. If there is any question, please check for the responsible provider for individual orders in the EHR system. If discharged, the patient was instructed to return to the emergency department with any worsening symptoms, if new symptoms arise, or if they have any other concerns.   Patient was instructed not to drive home if discharged today and received pain medications or other

## 2018-10-02 PROBLEM — E86.0 DEHYDRATION: Status: ACTIVE | Noted: 2018-01-01

## 2018-10-02 PROBLEM — D72.829 LEUKOCYTOSIS: Status: ACTIVE | Noted: 2018-01-01

## 2018-10-02 NOTE — CARE COORDINATION
CASE MANAGEMENT NOTE:    Admission Date:  10/1/2018 Dulce Reed is a 80 y.o.  female    Admitted for : Altered mental status [R41.82]    Met with:  Patient unable to participate in communication at time time-answers from bother and sister- Selena Yoder    PCP:  Dr Hernandez [place]:  Dot Saha      Current Residence/ Living Arrangements: PTA independent at home             Current Services PTA:  No    Is patient agreeable to VNS: Patient unable to make this decision at this time    Freedom of choice provided: Patient unable to make this decision at this time    List of [address] provided: Patient unable to make this decision at this time    VNS chosen:  Patient unable to make this decision at this time    DME:  straight cane and walker    Home Oxygen: No    Nebulizer: No    CPAP/BIPAP: No    Supplier: N/A    Potential Assistance Needed: Yes    SNF needed: Anticipate need    Pharmacy:  [facility]       Is the Patient an CareSimply with Readmission Risk Score greater than 14%? No- 13%  If yes, pt needs a follow up appointment made within 7 days. Does Patient want to use MEDS to BEDS? No    Family Members/Caregivers that pt would like involved in their care:    Yes    If yes, list name here:  Selena Yoder    Transportation Provider:  Family                      Discharge Plan:  10/2/2018 Dot Saha; Prior to admission patient's sister stated that she could independently function in her apartment and family would provide transportation; DME- cane and walker; Case Management and LSW to follow for discharge planning; Admitted with Altered Mental Status; IVF, IV rocephin and flagyl, and clear liquid diet; JEFFERY NEEDS SIGNED/COMPLETED//MARTHA                  Electronically signed by:  Fallon Beckwith RN on 10/2/2018 at 2:28 PM

## 2018-10-03 NOTE — PROGRESS NOTES
Disease:   <60 mL/min/1.73sq m  Kidney failure:   <15 mL/min/1.73sq m              eGFR calculated using average adult body mass.  Additional eGFR calculator   available at:        [email or web address]            GFR Staging 10/02/2018 NOT REPORTED   Final    Alb 10/02/2018 2.9* 3.5 - 5.2 g/dL Final    Alkaline Phosphatase 10/02/2018 71  35 - 104 U/L Final    ALT 10/02/2018 13  5 - 33 U/L Final    AST 10/02/2018 24  <32 U/L Final    Total Bilirubin 10/02/2018 0.54  0.3 - 1.2 mg/dL Final    Bilirubin, Direct 10/02/2018 0.20  <0.31 mg/dL Final    Bilirubin, Indirect 10/02/2018 0.34  0.00 - 1.00 mg/dL Final    Total Protein 10/02/2018 6.7  6.4 - 8.3 g/dL Final    Globulin 10/02/2018 NOT REPORTED  1.5 - 3.8 g/dL Final    Albumin/Globulin Ratio 10/02/2018 NOT REPORTED  1.0 - 2.5 Final    Amylase 10/02/2018 46  28 - 100 U/L Final    Lipase 10/02/2018 10* 13 - 60 U/L Final    Vitamin B-12 10/02/2018 293  232 - 1245 pg/mL Final    Folate 10/02/2018 18.9  >4.8 ng/mL Final    TSH 10/02/2018 1.20  0.30 - 5.00 mIU/L Final    WBC 10/03/2018 14.3* 3.5 - 11.0 k/uL Final    RBC 10/03/2018 4.20  4.0 - 5.2 m/uL Final    Hemoglobin 10/03/2018 13.2  12.0 - 16.0 g/dL Final    Hematocrit 10/03/2018 40.8  36 - 46 % Final    MCV 10/03/2018 97.1  80 - 100 fL Final    MCH 10/03/2018 31.5  26 - 34 pg Final    MCHC 10/03/2018 32.4  31 - 37 g/dL Final    RDW 10/03/2018 13.2  11.5 - 14.9 % Final    Platelets 05/85/4334 193  150 - 450 k/uL Final    MPV 10/03/2018 9.0  6.0 - 12.0 fL Final    NRBC Automated 10/03/2018 NOT REPORTED  per 100 WBC Final    Differential Type 10/03/2018 NOT REPORTED   Final    Immature Granulocytes 10/03/2018 NOT REPORTED  0 % Final    Absolute Immature Granulocyte 10/03/2018 NOT REPORTED  0.00 - 0.30 k/uL Final    WBC Morphology 10/03/2018 NOT REPORTED   Final    RBC Morphology 10/03/2018 NOT REPORTED   Final    Platelet Estimate 99/92/0202 NOT REPORTED   Final Hypertension    Hypertrophy of nasal turbinates    Infiltrating ductal carcinoma of left breast (HCC)    Joint pain of right hip on movement    Pain, joint, lower leg, right    Altered mental status    Dehydration    Leukocytosis         Kian Myrick M.D.   Neurology  10/3/2018  5:57 PM

## 2018-10-04 PROBLEM — G93.41 METABOLIC ENCEPHALOPATHY: Status: ACTIVE | Noted: 2018-01-01

## 2018-10-04 NOTE — PROGRESS NOTES
lower leg, right     Altered mental status     Dehydration     Leukocytosis     Metabolic encephalopathy      Electronically signed by Antoine Burt MD on 10/4/2018 at 7:59 AM

## 2018-10-04 NOTE — PROGRESS NOTES
Speech Language Pathology  Facility/Department: [facility] PROGRESSIVE CARE   BEDSIDE SWALLOW EVALUATION    NAME: Archana Arteaga  : 1923  MRN: [id number]    ADMISSION DATE: 10/1/2018  ADMITTING DIAGNOSIS: has Constipation; Edema; Esophageal reflux; Hypertension; Hypertrophy of nasal turbinates; Infiltrating ductal carcinoma of left breast ([address] Joint pain of right hip on movement; Pain, joint, lower leg, right; Altered mental status; Dehydration; Leukocytosis; and Metabolic encephalopathy on her problem list.    Recent Chest Xray/CT of Chest:   10/01- CXR-   Minimal linear right lower lobe opacity likely represents atelectasis or   scarring. Date of Eval: 10/4/2018  Evaluating Therapist: Amanda Dubose    Current Diet level:  Current Diet : NPO  Current Liquid Diet : NPO      Primary Complaint   Per ER physician report:  28-year-old female presents with the mental status changes. She was last checked on a few days ago. Patient was found in a chair at home in her night clothes. Patient is otherwise alert and stable at baseline. Care of herself at home clicks her own meals. It are still dressed and gets around. Patient was shopping on Saturday without any deficits. Patient here in today with feeling of something that of a mental slowing. Patient is somewhat somnolent but easily arousable. Patient given a further evaluation treatment. Patient was breast-feeding patient. Patient is subsequently here for progressive worsening mental status change. No subjective fevers, sweats, chills. No nausea, vomiting, diarrhea. Patient is otherwise alert and stable. Patient will be subsequently set up for further evaluation. Probable admission is planned. Pain:  Pain Assessment  Patient Currently in Pain:  (no s/s, pt. unable to answer ? s)  Pain Assessment: Faces    Reason for Referral  Archana Arteaga was referred for a bedside swallow evaluation to assess the efficiency of her swallow function, identify signs

## 2018-10-05 NOTE — FLOWSHEET NOTE
10/05/18 0732   Provider Notification   Reason for Communication Evaluate   Provider Name Dr Collette Sanes   Provider Notification Physician   Method of Communication Face to face   Response In department   Notification Time 0732     MD rounded and assessed pt. MD asked RN to ask neurology to assess CT and if neuro believes results to indicate a new stroke, ask neuro if a PEG tube or hospice will be appropriate for pt. MD also stated that once neuro gives recommendations, approach family about decision with recommendations.

## 2018-10-05 NOTE — CARE COORDINATION
ONGOING DISCHARGE PLAN:    Spoke with patient's Daughter, Anne Mitchell, regarding discharge plan and patient confirms that plan is still to follow for possible DC to SNF, PT is Rec SNF. Per LSW, no beds available to 1st choice of [place] in [place]. 2nd Choice is [place] in [place]. LSW continues to follow. New consult for Palliative Care to see today. Per Nursing Notes: Neuro, believes that Pt.may have had another stroke & rec NG for Nutrition w/ possible Peg on Monday. Will continue to follow for additional discharge needs.     Electronically signed by Marlon Leyden, RN on 10/5/2018 at 2:08 PM

## 2018-10-05 NOTE — FLOWSHEET NOTE
10/05/18 1020   Provider Notification   Reason for Communication Review case   Provider Name Dr Susan Triplett   Provider Notification Physician   Method of Communication Face to face   Response At bedside   Notification Time [phone]     MD rounded and assessed pt. MD states that he believes that pt may have had another stroke during admission d/t left side weakness. MD recommends that an NG be placed for nutrition and re-evaluate need for PEG tube on Monday. MD states that consult for palliative/hospice is a decision for the family to make.

## 2018-10-05 NOTE — PROGRESS NOTES
Progress Note    10/5/2018 8:11 AM  Subjective: Interval History: pt not answering questions, difficult to arouse. CT brain noted. Staff report no other issues. Diet: Diet NPO Effective Now    Medications:   Reviewed medications    Labs:   CBC:   Recent Labs      10/05/18   0517   WBC  17.8*   HGB  13.1   PLT  204     BMP:    Recent Labs      10/05/18   0517   NA  142   K  4.5   CL  108*   CO2  22   BUN  22   CREATININE  0.86   GLUCOSE  206*     Hepatic:   Recent Labs      10/02/18   0824   AST  24   ALT  13   BILITOT  0.54   ALKPHOS  71     Troponin: No results for input(s): TROPONINI in the last 72 hours. BNP: No results for input(s): BNP in the last 72 hours. Lipids: No results for input(s): CHOL, HDL in the last 72 hours. Invalid input(s): LDLCALCU  INR: No results for input(s): INR in the last 72 hours.     CBC:   Lab Results   Component Value Date    WBC 17.8 10/05/2018    RBC 4.26 10/05/2018    RBC 4.52 12/02/2011    HGB 13.1 10/05/2018    HCT 39.6 10/05/2018    MCV 92.9 10/05/2018    MCH 30.9 10/05/2018    MCHC 33.2 10/05/2018    RDW 13.3 10/05/2018     10/05/2018     12/02/2011    MPV 9.5 10/05/2018     BMP:    Lab Results   Component Value Date     10/05/2018    K 4.5 10/05/2018     10/05/2018    CO2 22 10/05/2018    BUN 22 10/05/2018    LABALBU 2.9 10/02/2018    LABALBU 4.1 12/02/2011    CREATININE 0.86 10/05/2018    CALCIUM 8.4 10/05/2018    GFRAA >60 10/05/2018    LABGLOM >60 10/05/2018    GLUCOSE 206 10/05/2018    GLUCOSE 101 03/06/2018       Objective:   Vitals: BP (!) 174/70   Pulse 71   Temp 98.4 °F (36.9 °C) (Axillary)   Resp 20   Ht 5' (1.524 m)   Wt 193 lb 2 oz (87.6 kg)   SpO2 94%   BMI 37.72 kg/m²   General appearance: alert and cooperative with exam  Neck: no adenopathy, no carotid bruit, no JVD, supple, symmetrical, trachea midline and thyroid not enlarged, symmetric, no tenderness/mass/nodules  Lungs: clear to auscultation bilaterally  Heart: regular rate and rhythm, S1, S2 normal, no murmur, click, rub or gallop  Abdomen: soft, non-tender; bowel sounds normal; no masses,  no organomegaly  Extremities: extremities normal, atraumatic, no cyanosis or edema  Neurologic: Mental status: lethargic, difficult to arouse    Assessment and Plan:   1. New CVA left sided  2. Leukocytosis- no source found. DC ABX and observe  3.  If neurology agrees with new CVA, will ask if PEG vs. Hospice care appropriate, nursing then to discuss with family      Patient Active Problem List:     Constipation     Edema     Esophageal reflux     Hypertension     Hypertrophy of nasal turbinates     Infiltrating ductal carcinoma of left breast ([address]     Joint pain of right hip on movement     Pain, joint, lower leg, right     Altered mental status     Dehydration     Leukocytosis     Metabolic encephalopathy      Electronically signed by Beth Morel MD on 10/5/2018 at 8:11 AM

## 2018-10-05 NOTE — FLOWSHEET NOTE
10/05/18 1039   Family Notification   Reason NG placement allowance   Name [address] Call   Relationship Child     RN did explain to pt that Dr Eliseo Ziegler is recommending NG placement for nutrition and re-evaluate any further need on Monday and RN did ask Renata Hendrix if she felt that a palliative care consult will be beneficial at this time. Renata Hendrix did state that she would like a palliative care on her mother's case and would like someone from palliative care to speak with prior to NG placement if possible.

## 2018-10-06 NOTE — FLOWSHEET NOTE
10/06/18 1415   Encounter Summary   Services provided to: Patient and family together  (daughter)   Referral/Consult From: Rounding; Other    Support System Children   Continue Visiting (10/6/18)   Complexity of Encounter Moderate   Length of Encounter 45 minutes   Spiritual Assessment Completed Yes   Spiritual/Taoist   Type Spiritual support   Assessment Approachable;Tearful; Anticipatory grief; Hopeful;Unable to respond  (PT did not respond)   Intervention Active listening;Explored feelings, thoughts, concerns;Prayer;Sustaining presence/ Ministry of presence; Discussed death;Discussed afterlife; Discussed meaning/purpose;Discussed relationship with God;Discussed belief system/Baptism practices/[place];Discussed illness/injury and it's impact   Outcome Expressed gratitude;Expressed feelings of beti, peace, and/or awe;Engaged in conversation;Expressed feelings/needs/concerns; Tearful; Shared reminiscences;Did not respond

## 2018-10-06 NOTE — PROGRESS NOTES
3-5x/week  Current Treatment Recommendations: Strengthening, ROM, Functional Mobility Training, Transfer Training  Plan Comment: to continue PT per POC  Safety Devices  Type of devices: Left in bed, Call light within reach  Restraints  Initially in place: No     Therapy Time   Individual Concurrent Group Co-treatment   Time In 1500         Time Out 1515         Minutes 15         Timed Code Treatment Minutes: 15 Minutes     Electronically signed by: Susana Burns, PT

## 2018-10-06 NOTE — CARE COORDINATION
DISCHARGE PLANNING NOTE:    Family had Hospice meeting today and have decided that they would like to go with Hospice care at [facility] in [place]. I attempted to call Li at [phone] with no answer. I then called Balta Del Rio who is listed on the website at [phone] who is listed as . I left her a message and informed her of the situation and requested a call back to my work phone [phone]. Will continue to follow.      Electronically signed by Conchis Abebe RN on 10/6/2018 at 4:48 PM

## 2018-10-07 PROBLEM — I63.9 CVA (CEREBRAL VASCULAR ACCIDENT) (HCC): Status: ACTIVE | Noted: 2018-01-01

## 2018-10-07 NOTE — CARE COORDINATION
DISCHARGE PLANNING NOTE:    I spoke with Balta Del Rio from [facility] in [place] and she states that they will be able to take the patient today, but that she needed to call her supervisor on call to make sure and she will call me back. I informed her that I need to reach out to [facility] to see if they will beable to open the case today. Will continue to follow. Electronically signed by Conchis Abebe RN on 10/7/2018 at 9:37 AM    I spoke with Rosi Olsen from [facility] and informed her of the situation and that we would like them to open the Hospice case for this patient today at [facility] in [place]. She informed me that they will be able to accommodate this and open the case today if needed. I faxed [facility] the following to [phone]:  Facesheet, labs, H&P, med list, hospice consult order, radiology results and last 3 days progress notes. Will continue to follow. Electronically signed by Conchis Abebe RN on 10/7/2018 at 9:47 AM      I received a call back from Rosi Olsen at [facility] and she informed me that she received all of the paperwork and that they are accepting this patient and that they have a nurse available to open the case today if needed. I informed Rosi Olsen that it will hopefully be today and that I will keep her posted. I am waiting for a call back from family (Daughter Karthik Pinedo) to ensure that they are okay with this plan and to inform them that they will owe [facility] $230/day and that they need a weeks down payment of $1610 and they would need that by tomorrow. Will continue to follow. Electronically signed by Conchis Abebe RN on 10/7/2018 at 10:50 AM     I spoke with the patients daughter Karthik Pinedo and she is okay with the OOP cost of $1610 and that they can get that to the facility tomorrow. I called Balta Del Rio at [facility] and informed her of this and she stated that they can accept her today. I called [facility] Gustafson and scheduled transportation for 1400.    I faxed

## 2018-10-07 NOTE — PROGRESS NOTES
Progress Note    10/7/2018 10:47 AM  Subjective: Interval History: Pt not responding. Lethargic, moans. Diet: Diet NPO Effective Now    Medications:   Reviewed medications    Labs:   CBC:   Recent Labs      10/07/18   0527   WBC  14.6*   HGB  12.1   PLT  213     BMP:    Recent Labs      10/07/18   0527   NA  140   K  4.5   CL  106   CO2  26   BUN  27*   CREATININE  0.83   GLUCOSE  177*     Hepatic: No results for input(s): AST, ALT, ALB, BILITOT, ALKPHOS in the last 72 hours. Troponin: No results for input(s): TROPONINI in the last 72 hours. BNP: No results for input(s): BNP in the last 72 hours. Lipids: No results for input(s): CHOL, HDL in the last 72 hours. Invalid input(s): LDLCALCU  INR: No results for input(s): INR in the last 72 hours.     CBC:   Lab Results   Component Value Date    WBC 14.6 10/07/2018    RBC 3.91 10/07/2018    RBC 4.52 12/02/2011    HGB 12.1 10/07/2018    HCT 37.4 10/07/2018    MCV 95.4 10/07/2018    MCH 31.0 10/07/2018    MCHC 32.5 10/07/2018    RDW 13.3 10/07/2018     10/07/2018     12/02/2011    MPV 9.5 10/07/2018     BMP:    Lab Results   Component Value Date     10/07/2018    K 4.5 10/07/2018     10/07/2018    CO2 26 10/07/2018    BUN 27 10/07/2018    LABALBU 2.9 10/02/2018    LABALBU 4.1 12/02/2011    CREATININE 0.83 10/07/2018    CALCIUM 8.2 10/07/2018    GFRAA >60 10/07/2018    LABGLOM >60 10/07/2018    GLUCOSE 177 10/07/2018    GLUCOSE 101 03/06/2018       Objective:   Vitals: BP (!) 182/77   Pulse 64   Temp 97 °F (36.1 °C) (Axillary)   Resp 16   Ht 5' (1.524 m)   Wt 194 lb 7.1 oz (88.2 kg)   SpO2 100%   BMI 37.98 kg/m²   General appearance: alert and cooperative with exam  Neck: no adenopathy, no carotid bruit, no JVD, supple, symmetrical, trachea midline and thyroid not enlarged, symmetric, no tenderness/mass/nodules  Lungs: clear to auscultation bilaterally  Heart: regular rate and rhythm, S1, S2 normal, no murmur, click, rub or